# Patient Record
Sex: FEMALE | Employment: FULL TIME | URBAN - METROPOLITAN AREA
[De-identification: names, ages, dates, MRNs, and addresses within clinical notes are randomized per-mention and may not be internally consistent; named-entity substitution may affect disease eponyms.]

---

## 2018-04-02 ENCOUNTER — TELEPHONE (OUTPATIENT)
Dept: FAMILY MEDICINE | Facility: CLINIC | Age: 43
End: 2018-04-02

## 2018-04-02 NOTE — TELEPHONE ENCOUNTER
[facility] phone call message- general phone call:    Reason for call: Patient requesting appointment for work related back injury (may schedule at a future date).    Return call needed: No    OK to leave a message on voice mail? n/a    Primary language: English      needed? No    Call taken on April 2, 2018 at 11:16 AM by Jennifer Horvath

## 2018-04-03 ENCOUNTER — OFFICE VISIT (OUTPATIENT)
Dept: FAMILY MEDICINE | Facility: CLINIC | Age: 43
End: 2018-04-03
Payer: COMMERCIAL

## 2018-04-03 VITALS
RESPIRATION RATE: 16 BRPM | HEART RATE: 74 BPM | SYSTOLIC BLOOD PRESSURE: 112 MMHG | TEMPERATURE: 98.2 F | BODY MASS INDEX: 19.88 KG/M2 | OXYGEN SATURATION: 98 % | DIASTOLIC BLOOD PRESSURE: 76 MMHG | WEIGHT: 131.2 LBS | HEIGHT: 68 IN

## 2018-04-03 DIAGNOSIS — Z00.00 ROUTINE GENERAL MEDICAL EXAMINATION AT A HEALTH CARE FACILITY: Primary | ICD-10-CM

## 2018-04-03 LAB
HAV IGG SER QL IA: NONREACTIVE
HAV IGM SERPL QL IA: NONREACTIVE
HBV SURFACE AG SERPL QL IA: NONREACTIVE
HCG UR QL: NEGATIVE
HCV AB SERPL QL IA: NONREACTIVE
HIV 1+2 AB+HIV1 P24 AG SERPL QL IA: NONREACTIVE

## 2018-04-03 RX ORDER — BUPROPION HYDROCHLORIDE 75 MG/1
10 TABLET ORAL
COMMUNITY
Start: 2017-10-30

## 2018-04-03 RX ORDER — IBUPROFEN 600 MG/1
600 TABLET, FILM COATED ORAL
COMMUNITY
Start: 2017-11-13

## 2018-04-03 RX ORDER — TOPIRAMATE 100 MG/1
TABLET, FILM COATED ORAL
COMMUNITY
Start: 2017-10-30

## 2018-04-03 RX ORDER — PRAZOSIN HYDROCHLORIDE 5 MG/1
5 CAPSULE ORAL
COMMUNITY
Start: 2017-08-15

## 2018-04-03 NOTE — PROGRESS NOTES
SUBJECTIVE:  The patient is here for a [place] [facility] physical exam.  She did fill out a health history form.  Her main medical problem that she had in the past was work injury many years ago which has been settled.  She said she has chronic back pain with that which now seems to be giving her some hip pain and right leg pain.  She is on some ibuprofen and gabapentin for that.  Also, history of PTSD and depression.  She said there were some spots were found on some sort of scan at the level T2 and T1 and her liver.  A biopsy was done of the liver one and turned out to be hemangioma.  Otherwise, she filled out the 20 item ROS and the only thing that was positive on that was muscle/joint pains, weakness, dizziness, numbness, tingling in her right leg.  She did fill out a PHQ-9 and had a score of 10 and VAISHALI-7 with a score of 9.      OBJECTIVE:  On exam, the patient is in no acute distress.  Vital signs are stable.  TMs are normal.  Pupils equally round and reactive to light.  Fundi benign.  Pharynx unremarkable.  No cervical adenopathy.  Heart had a regular rate and rhythm without murmurs.  Lungs were clear.  Abdomen was soft and nontender without masses.  Patellar reflex on the right knee was decreased.      IMPRESSION:  Stable medical problems.      PLAN:  I ordered the standard [facility] labs.  She was not worried about STDs.  She said for a pregnancy test she had her tubes tied and had endometrial ablation, but we went ahead and did that anyway since it is required.

## 2018-04-03 NOTE — MR AVS SNAPSHOT
"              After Visit Summary   4/3/2018    Johann Qiu    MRN: [id number]           Patient Information     Date Of Birth          1975        Visit Information        Provider Department      4/3/2018 9:40 AM Faith Morales MD [facility]        Today's Diagnoses     Routine general medical examination at a health care facility    -  1       Follow-ups after your visit        Who to contact     Please call your clinic at [phone] to:    Ask questions about your health    Make or cancel appointments    Discuss your medicines    Learn about your test results    Speak to your doctor            Additional Information About Your Visit        Care EveryWhere ID     This is your Care EveryWhere ID. This could be used by other organizations to access your [facility] medical records  [id number]        Your Vitals Were     Pulse Temperature Respirations Height Pulse Oximetry Breastfeeding?    74 98.2  F (36.8  C) (Oral) 16 5' 8\" (172.7 cm) 98% No    BMI (Body Mass Index)                   19.95 kg/m2            Blood Pressure from Last 3 Encounters:   04/03/18 112/76   10/30/01 104/68    Weight from Last 3 Encounters:   04/03/18 131 lb 3.2 oz (59.5 kg)   10/30/01 125 lb (56.7 kg)              We Performed the Following     HCG Qualitative Urine (UPT) ([facility])     Hep A Atby, Total w/Refl IGM ([facility])     Hepatitis B surface antigen     Hepatitis C antibody     HIV Antigen Antibody Combo     Tuberculosis by Quantiferon        Primary Care Provider Fax #    Physician No Ref-Primary [phone]       No address on file        Equal Access to Services     MISAEL DAVIS : Hadii aad ku parkero Sobarbie, waaxda luqadaha, qaybta kaalmada chavezyada, waxjarrod alli castillo . So [facility] [phone].    ATENCIÓN: Si habla español, tiene a amin disposición servicios gratuitos de asistencia lingüística. Llame al [phone].    We comply with applicable federal civil rights " laws and [place] laws. We do not discriminate on the basis of race, color, national origin, age, disability, sex, sexual orientation, or gender identity.            Thank you!     Thank you for choosing [facility]  for your care. Our goal is always to provide you with excellent care. Hearing back from our patients is one way we can continue to improve our services. Please take a few minutes to complete the written survey that you may receive in the mail after your visit with us. Thank you!             Your Updated Medication List - Protect others around you: Learn how to safely use, store and throw away your medicines at www.disposemymeds.org.          This list is accurate as of 4/3/18 10:23 AM.  Always use your most recent med list.                   Brand Name Dispense Instructions for use Diagnosis    GABAPENTIN PO      Take 100 mg by mouth        ibuprofen 600 MG tablet    ADVIL/MOTRIN     Take 600 mg by mouth        nicotine 7 MG/24HR 24 hr patch    NICODERM CQ     1 patch        ORTHO TRI-CYCLEN TABS   OR     3 PK    1 tab po as directed        penicillin V potassium 500 MG tablet    VEETID    30    1 tab PO TID (Three times per day) X10 days        prazosin 5 MG capsule    MINIPRESS     Take 5 mg by mouth        topiramate 100 MG tablet    TOPAMAX     Take 1 tablet twice a day.        * WELLBUTRIN 100 MG tablet   Generic drug:  buPROPion     90    1 TABLET 3 TIMES DAILY        * buPROPion 75 MG tablet    WELLBUTRIN     Take 10 mg by mouth        * Notice:  This list has 2 medication(s) that are the same as other medications prescribed for you. Read the directions carefully, and ask your doctor or other care provider to review them with you.

## 2018-04-05 ENCOUNTER — TELEPHONE (OUTPATIENT)
Dept: FAMILY MEDICINE | Facility: CLINIC | Age: 43
End: 2018-04-05

## 2018-04-05 LAB
M TB TUBERC IFN-G BLD QL: NEGATIVE
M TB TUBERC IFN-G/MITOGEN IGNF BLD: 0.01 IU/ML

## 2018-04-05 NOTE — TELEPHONE ENCOUNTER
"Message per Dr. Morales: \"My understanding was that the lab took care of the [place] [facility] pt lab results. That the results went directly to an  there. Let me know if that is not correct.\"    Message routed to lab to send results to Abigail.    Carrie Pinon RN    "

## 2018-04-05 NOTE — TELEPHONE ENCOUNTER
[facility] phone call message- patient requesting results:    Test: Lab    Date of test: 4/2/2018    Additional Comments: Abigail with [place] [facility] is requesting lab results for patient.    OK to leave a message on voice mail? Yes    Primary language: English      needed? No    Call taken on April 5, 2018 at 8:13 AM by Radha Lopez

## 2018-04-09 NOTE — TELEPHONE ENCOUNTER
Abigail from [facility] called and states that they still didn't get the lab result that was done on 04/03/2018.

## 2018-04-09 NOTE — TELEPHONE ENCOUNTER
I was not here on Friday so this was in my inbox, if Jossy took care of it on Friday then they now have 2 copies.  MR

## 2018-05-18 ENCOUNTER — HEALTH MAINTENANCE LETTER (OUTPATIENT)
Age: 43
End: 2018-05-18